# Patient Record
Sex: MALE | Race: WHITE
[De-identification: names, ages, dates, MRNs, and addresses within clinical notes are randomized per-mention and may not be internally consistent; named-entity substitution may affect disease eponyms.]

---

## 2017-07-10 ENCOUNTER — HOSPITAL ENCOUNTER (OUTPATIENT)
Dept: HOSPITAL 35 - PAIN | Age: 67
Discharge: HOME | End: 2017-07-10
Attending: ANESTHESIOLOGY
Payer: COMMERCIAL

## 2017-07-10 VITALS — HEIGHT: 72.99 IN | BODY MASS INDEX: 26.9 KG/M2 | WEIGHT: 203 LBS

## 2017-07-10 VITALS — DIASTOLIC BLOOD PRESSURE: 76 MMHG | SYSTOLIC BLOOD PRESSURE: 132 MMHG

## 2017-07-10 DIAGNOSIS — M25.551: Primary | ICD-10-CM

## 2017-07-10 DIAGNOSIS — F11.20: ICD-10-CM

## 2018-05-10 ENCOUNTER — HOSPITAL ENCOUNTER (OUTPATIENT)
Dept: HOSPITAL 35 - PAIN | Age: 68
End: 2018-05-10
Attending: ANESTHESIOLOGY
Payer: COMMERCIAL

## 2018-05-10 VITALS — BODY MASS INDEX: 29.24 KG/M2 | WEIGHT: 220.6 LBS | HEIGHT: 72.99 IN

## 2018-05-10 VITALS — DIASTOLIC BLOOD PRESSURE: 83 MMHG | SYSTOLIC BLOOD PRESSURE: 139 MMHG

## 2018-05-10 DIAGNOSIS — G89.29: ICD-10-CM

## 2018-05-10 DIAGNOSIS — M25.551: Primary | ICD-10-CM

## 2018-12-06 ENCOUNTER — HOSPITAL ENCOUNTER (OUTPATIENT)
Dept: HOSPITAL 35 - PAIN | Age: 68
End: 2018-12-06
Attending: CLINICAL NURSE SPECIALIST
Payer: COMMERCIAL

## 2018-12-06 VITALS — DIASTOLIC BLOOD PRESSURE: 86 MMHG | SYSTOLIC BLOOD PRESSURE: 139 MMHG

## 2018-12-06 VITALS — BODY MASS INDEX: 29.29 KG/M2 | WEIGHT: 221 LBS | HEIGHT: 72.99 IN

## 2018-12-06 DIAGNOSIS — G89.29: ICD-10-CM

## 2018-12-06 DIAGNOSIS — M19.90: ICD-10-CM

## 2018-12-06 DIAGNOSIS — Z79.899: ICD-10-CM

## 2018-12-06 DIAGNOSIS — M25.551: Primary | ICD-10-CM

## 2019-07-18 ENCOUNTER — HOSPITAL ENCOUNTER (OUTPATIENT)
Dept: HOSPITAL 35 - PAIN | Age: 69
End: 2019-07-18
Attending: CLINICAL NURSE SPECIALIST
Payer: COMMERCIAL

## 2019-07-18 VITALS — DIASTOLIC BLOOD PRESSURE: 84 MMHG | SYSTOLIC BLOOD PRESSURE: 127 MMHG

## 2019-07-18 VITALS — HEIGHT: 72.99 IN | BODY MASS INDEX: 29.21 KG/M2 | WEIGHT: 220.4 LBS

## 2019-07-18 DIAGNOSIS — M96.1: ICD-10-CM

## 2019-07-18 DIAGNOSIS — M25.551: Primary | ICD-10-CM

## 2019-07-18 DIAGNOSIS — Z79.891: ICD-10-CM

## 2019-07-18 DIAGNOSIS — G89.29: ICD-10-CM

## 2019-07-18 NOTE — NUR
Pain Clinic Assessment:
 
1. History of Osteoarthritis:
RT HIP
   History of Rheumatoid Arthritis:
Not Applicable
 
2. Height: 6 ft. 1 in. 185.4 cm.
   Weight: 220.4 lb.  oz. 99.973 kg.
   Patient's BMI: 29.1
 
3. Vital Signs:
   BP: 127/84 Pulse: 70 Resp: 16
   Temp:  02 Sat: 96 ECG Mon:
 
4. Pain Intensity: 3
 
5. Fall Risk:
   Dizziness: N  Needs help standing or walking: N
   Fallen in the last 3 months: N
   Fall risk comments:
 
 
6. Patient on Blood Thinner: None
 
7. History of Hypertension: N
 
8. Opioid Therapy greater than 6 weeks: Y
   Opiate Contract Signed: 10/15/14
 
9. Risk Assessment Tool Provided: Opioid Risk Tool
 
10. Functional Assessment Tool:
 
11. Recreational Drug Use: Never Drug Type:
    Tobacco Use: Never Smoker Tobacco Type:
       Amount or Packs/day:  How Many Years:
    Alcohol Use: No  Frequency:  Quant:

## 2019-11-11 ENCOUNTER — HOSPITAL ENCOUNTER (OUTPATIENT)
Dept: HOSPITAL 35 - PAIN | Age: 69
End: 2019-11-11
Attending: CLINICAL NURSE SPECIALIST
Payer: COMMERCIAL

## 2019-11-11 VITALS — WEIGHT: 221 LBS | HEIGHT: 72.99 IN | BODY MASS INDEX: 29.29 KG/M2

## 2019-11-11 VITALS — SYSTOLIC BLOOD PRESSURE: 155 MMHG | DIASTOLIC BLOOD PRESSURE: 80 MMHG

## 2019-11-11 DIAGNOSIS — Z79.899: ICD-10-CM

## 2019-11-11 DIAGNOSIS — M96.1: ICD-10-CM

## 2019-11-11 DIAGNOSIS — Z79.891: ICD-10-CM

## 2019-11-11 DIAGNOSIS — G89.4: ICD-10-CM

## 2019-11-11 DIAGNOSIS — M25.551: Primary | ICD-10-CM

## 2019-11-11 NOTE — NUR
Pain Clinic Assessment:
 
1. History of Osteoarthritis:
RT HIP
   History of Rheumatoid Arthritis:
Not Applicable
 
2. Height: 6 ft. 1 in. 185.4 cm.
   Weight: 221.0 lb.  oz. 100.245 kg.
   Patient's BMI: 29.2
 
3. Vital Signs:
   BP: 155/80 Pulse: 62 Resp: 14
   Temp:  02 Sat: 98 ECG Mon:
 
4. Pain Intensity: 3
 
5. Fall Risk:
   Dizziness: N  Needs help standing or walking: N
   Fallen in the last 3 months: N
   Fall risk comments:
 
 
6. Patient on Blood Thinner: None
 
7. History of Hypertension: N
 
8. Opioid Therapy greater than 6 weeks: Y
   Opiate Contract Signed: 10/15/14
 
9. Risk Assessment Tool Provided: 4-MOD RISK
 
10. Functional Assessment Tool: 23/70
 
11. Recreational Drug Use: Never Drug Type:
    Tobacco Use: Never Smoker Tobacco Type:
       Amount or Packs/day:  How Many Years:
    Alcohol Use: No  Frequency:  Quant:

## 2019-11-12 NOTE — HPC
[facility]
[address]
[place]   [address]                     PAIN MANAGEMENT CONSULTATION  
_______________________________________________________________________________
 
Name:       WILFREDO PANTOJA         Room #:                     REG [facility] 
ADEEL#:      [id number]                       Account #:      [id number]  
Admission:  11/11/19    Attend Phys:    Kathy Levy     
Discharge:              Date of Birth:  [birth year]  
                                                          Report #: [id number]
                                                                    [id number]   
_______________________________________________________________________________
THIS REPORT FOR:   //name//                          
 
CC: Kathy Rangel [facility]
 
DATE OF SERVICE:  11/11/2019
 
 
CHIEF COMPLAINT:  Chronic intractable hip pain.
 
HISTORY OF PRESENT ILLNESS:  This is a very pleasant 69-year-old gentleman who
returns to the pain clinic today for his ongoing right hip pain.  He believes it
is piriformis in nature.  He is rating a pain score of 3/10 today as a dull
aching pain, worse when he is very active and walking or driving his car.  He
feels that his medication as well as lying down are quite beneficial.  He is
here actually 4 months since his last appointment.  He was able to make his
medicines last that long, though he reports he has not been aware that it had
been 4 months since his last visit.  He feels that some days he must do better
than others, forgetting to take his pills when he is more active and not needing
them.  He denies any problems with constipation or daytime sleepiness from his
medications.  He does safeguard those especially when he is away from home and
traveling.  Today, he would like refills of his OxyContin and hydrocodone.
 
ALLERGIES:  DEMEROL.
 
CURRENT LIST OF MEDICATIONS:  OxyContin 20 mg b.i.d., hydrocodone 7.5/325 b.i.d.
p.r.n., Januvia 100 mg daily, Cymbalta 60 mg daily, Synthroid 125 mcg daily,
Glucophage 1000 mg b.i.d., and lorazepam p.r.n.
 
PQRS:
1.  The patient has osteoarthritis in his right hip.  He denies any rheumatoid
arthritis.
2.  Height is 6 feet 1 inch, weight is 221.  BMI is 29.
3.  Vital signs 155/80, pulse is 62, respirations 14, oxygen sat is 98.
4. Pain score is 3.
5.  Denies dizziness, does not need help walking or standing, has not fallen in
the last 3 months.
6.  The patient is not on blood thinner or hypertension medicines.
7.  Opioid therapy is greater than 6 weeks; therefore, an opioid signed contract
is on the chart.  Risk assessment tool is moderate.  Functional assessment is
23/70.
8.  Recreational drug use, he denies.  He is not a smoker and does not drink
alcohol.
 
According to the prescription monitoring system, the patient is filling
appropriately for his medications, actually filling slightly longer than 30 days
 
 
 
[place] [address]                     PAIN MANAGEMENT CONSULTATION  
_______________________________________________________________________________
 
Name:       WILFREDO PANTOJA         Room #:                     REG REYM DENIS#:      [id number]                       Account #:      [id number]  
Admission:  11/11/19    Attend Phys:    Kathy Levy     
Discharge:              Date of Birth:  [birth year]  
                                                          Report #: [id number]
                                                                    [id number]   
_______________________________________________________________________________
and in between fills, he tells me he safeguards his medications at all times.
 
PHYSICAL EXAMINATION:
GENERAL:  This is a pleasant and alert 69-year-old gentleman who appears his
stated age, placing his current pain score at 3/10 today.  He is a good
historian.
HEENT:  Normocephalic, atraumatic.  Extraocular eye muscles are intact.
NECK:  Without adenopathy or JVD.
MUSCULOSKELETAL:  He walks with a normal gait.  He has tenderness over his right
hip with no radiculopathy.  Strength in his lower extremities judged to be 5/5
in all major muscle groups.
 
IMPRESSION:
1.  Chronic intractable right hip pain.
2.  Post-laminectomy syndrome.
3.  Management of high risk medications under terms of written opioid agreement.
 
PLAN:
1.  We discussed treatment options with the patient today.  The patient feels he
is doing quite well with his current medication regimen, requiring some days
less than the prescribed amount, doing quite well, is keeping very active and
traveling, busy at home.  Scripts given today for his hydrocodone 7.5/325, #60
and OxyContin 20 mg, #60.  Those medicines were released today, 4-week and
8-week.  MRI electronically sent to [facility].
2.  According to the prescription monitoring system, the patient's morphine
milliequivalent is 75 morphine milliequivalents per day.
3.  The patient will return in 3-4 months as needed for medication refills.  He
will continue to safeguard these medications.
 
The patient is seen in collaboration with Dr. Konstantin Patterson today.
 
 
 
 
 
 
 
 
 
 
 
 
 
 
  <ELECTRONICALLY SIGNED>
   By: Kathy Levy             
  11/12/19     1035
D: 11/11/19 1540                           _____________________________________
T: 11/12/19 0411                           Kathy Levy               /nt

## 2020-02-13 ENCOUNTER — HOSPITAL ENCOUNTER (OUTPATIENT)
Dept: HOSPITAL 35 - PAIN | Age: 70
End: 2020-02-13
Attending: CLINICAL NURSE SPECIALIST
Payer: COMMERCIAL

## 2020-02-13 VITALS — BODY MASS INDEX: 29.9 KG/M2 | HEIGHT: 72.99 IN | WEIGHT: 225.6 LBS

## 2020-02-13 VITALS — DIASTOLIC BLOOD PRESSURE: 88 MMHG | SYSTOLIC BLOOD PRESSURE: 127 MMHG

## 2020-02-13 DIAGNOSIS — Z79.899: ICD-10-CM

## 2020-02-13 DIAGNOSIS — M16.11: ICD-10-CM

## 2020-02-13 DIAGNOSIS — G89.4: ICD-10-CM

## 2020-02-13 DIAGNOSIS — Z79.891: ICD-10-CM

## 2020-02-13 DIAGNOSIS — M96.1: ICD-10-CM

## 2020-02-13 DIAGNOSIS — Z76.0: Primary | ICD-10-CM

## 2020-02-13 NOTE — NUR
Pain Clinic Assessment:
 
1. History of Osteoarthritis:
RT HIP
   History of Rheumatoid Arthritis:
Not Applicable
 
2. Height: 6 ft. 1 in. 185.4 cm.
   Weight: 225.6 lb.  oz. 102.332 kg.
   Patient's BMI: 29.8
 
3. Vital Signs:
   BP: 127/88 Pulse: 75 Resp: 16
   Temp:  02 Sat: 98 ECG Mon:
 
4. Pain Intensity: 3-4
 
5. Fall Risk:
   Dizziness: N  Needs help standing or walking: N
   Fallen in the last 3 months: N
   Fall risk comments:
 
 
6. Patient on Blood Thinner: None
 
7. History of Hypertension: N
 
8. Opioid Therapy greater than 6 weeks: Y
   Opiate Contract Signed: 10/15/14
 
9. Risk Assessment Tool Provided: 4-MOD RISK
 
10. Functional Assessment Tool: 23/70
 
11. Recreational Drug Use: Never Drug Type:
    Tobacco Use: Never Smoker Tobacco Type:
       Amount or Packs/day:  How Many Years:
    Alcohol Use: No  Frequency:  Quant:

## 2020-02-18 NOTE — HPC
[facility]
[address]
[place]   [address]                     PAIN MANAGEMENT CONSULTATION  
_______________________________________________________________________________
 
Name:       WILFREDO PANTOJA         Room #:                     REG [facility] 
M.R.#:      [id number]                       Account #:      [id number]  
Admission:  02/13/20    Attend Phys:    Kathy Levy     
Discharge:              Date of Birth:  [birth year]  
                                                          Report #: [id number]
                                                                    [id number]   
_______________________________________________________________________________
THIS REPORT FOR:  
 
cc:  Juan Carlos Maradiaga MD,Juan Carlos Levy,Kathy FERNANDEZ                                            ~
THIS REPORT FOR:   //name//                          
 
CC: Kathy Maradiaga
 
DATE OF SERVICE:  02/13/2020
 
 
CHIEF COMPLAINT:  Chronic intractable hip pain.
 
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
uses to help treat his ongoing chronic right hip pain.  He reports it is a
constant, aching, dull pain of 3/10 today, it is worse with prolonged standing
and walking, especially up hills and driving a car.  He feels the medications
are very beneficial.  He reports no side effects of constipation or daytime
sleepiness.  Today, he would like refills of those medications.  He reports he
recently returned from [place] for a conference and he was able to be as active as
he would like with limited pain due to using his medications.
 
ALLERGIES:  DEMEROL.
 
CURRENT LIST OF MEDICATIONS:  OxyContin 20 mg b.i.d., hydrocodone 7.5/325
p.r.n., Januvia, Cymbalta, Synthroid, Glucophage, and lorazepam.
 
PQRS:
1.  He has osteoarthritis in his right hip.  Denies any rheumatoid arthritis.
2.  Height is 6 feet 1 inch, weight is 225.  BMI is 29.
3.  Vital signs 127/88, pulse is 75, respirations 16, oxygen sat is 98.
4.  Pain score is 3-4.
5.  Denies dizziness, does not need help walking or standing, has not fallen in
the last 3 months.
6.  The patient is not on any blood thinners or medicine for hypertension. 
Opioid therapy is greater than 6 weeks; therefore, an opioid signed contract is
on the chart.  Risk assessment is moderate.  Functional assessment is 23/70.
7.  Recreational drug use, he denies.  He is not a smoker and does not drink
alcohol.
 
According to the prescription monitoring system, the patient is filling
appropriately for his medication.  He is due to fill those today in a timely
fashion.  There is a recent drug screen on the chart.  We will check a random
 
 
 
[place] [address]                     PAIN MANAGEMENT CONSULTATION  
_______________________________________________________________________________
 
Name:       WILFREDO PANTOJA         Room #:                     REG AMADOR DENIS#:      [id number]                       Account #:      [id number]  
Admission:  02/13/20    Attend Phys:    Kathy Levy     
Discharge:              Date of Birth:  [birth year]  
                                                          Report #: [id number]
                                                                    [id number]   
_______________________________________________________________________________
drug screen on him at his next visit.  His morphine mEq according to the CDC
guidelines are 70 per day; therefore, he is seen every 2 months.
 
PHYSICAL EXAMINATION:
GENERAL:  This is alert and orientated 69-year-old gentleman who appears his
stated age, placing his current pain score at 3/10 today.
HEENT:  Normocephalic, atraumatic.  Mucous membranes are moist.
NECK:  He walks with a normal gait.
MUSCULOSKELETAL:  He has tenderness in his right hip with no radicular symptoms.
 His lower extremity strength judged to be 5/5 in all major muscle groups.  He
moves from sitting to standing easily with no difficulty.
 
IMPRESSION:
1.  Chronic intractable right hip pain.
2.  Post-laminectomy syndrome.
3.  Management of high risk medications under terms of written opioid agreement.
 
We reviewed the fact that opiate medications are being used to provide analgesia
adequate to support activities of daily living, not attempting to achieve a
specific pain score on the 0-10 Visual Analog Scale.  The current opiate
medications are providing sufficient analgesia to allow the patient to
participate in activities of daily living.  The patient is not exhibiting any
aberrant behavior suggestive of drug diversion.  The patient is not having any
adverse reactions to medications.  The patient is not suffering from daytime
somnolence or mental acuity changes.  The patient is managing opiate-induced
constipation with appropriate over-the-counter agents and dietary
considerations.  The patient was counseled on concern for caution with operating
a motor vehicle while using opiate medications.
 
PLAN:  We discussed treatment options with the patient today.  He is doing quite
well on his current medication regimen, having sufficient analgesic response
with being able to be as active as he would like with no side effects per his
report.  We will electronically send his OxyContin 20 mg b.i.d., #60 for today
at 4-week and 8-week release as well as his hydrocodone 7.5/325 for today for an
8-week release.  These will be sent electronically by Dr. Konstantin Patterson who
collaborated care with me today.
 
 
 
 
 
 
 
 
  <ELECTRONICALLY SIGNED>
   By: Kathy Levy             
  02/18/20     0858
D: 02/13/20 1452                           _____________________________________
T: 02/13/20 2055                           Kathy Levy               /nt

## 2020-05-28 ENCOUNTER — HOSPITAL ENCOUNTER (OUTPATIENT)
Dept: HOSPITAL 35 - PAIN | Age: 70
End: 2020-05-28
Attending: ANESTHESIOLOGY
Payer: COMMERCIAL

## 2020-05-28 VITALS — WEIGHT: 225.8 LBS | HEIGHT: 72.99 IN | BODY MASS INDEX: 29.93 KG/M2

## 2020-05-28 VITALS — DIASTOLIC BLOOD PRESSURE: 106 MMHG | SYSTOLIC BLOOD PRESSURE: 135 MMHG

## 2020-05-28 DIAGNOSIS — M16.11: Primary | ICD-10-CM

## 2020-05-28 DIAGNOSIS — M96.1: ICD-10-CM

## 2020-05-28 DIAGNOSIS — G89.29: ICD-10-CM

## 2020-05-28 DIAGNOSIS — F11.20: ICD-10-CM

## 2020-05-28 NOTE — NUR
Pain Clinic Assessment:
 
1. History of Osteoarthritis:
RT HIP
   History of Rheumatoid Arthritis:
Not Applicable
 
2. Height: 6 ft. 1 in. 185.4 cm.
   Weight: 225.8 lb.  oz. 102.422 kg.
   Patient's BMI: 29.8
 
3. Vital Signs:
   BP: 135/106 Pulse: 74 Resp: 18
   Temp:  02 Sat: 99 ECG Mon:
 
4. Pain Intensity: 3-4
 
5. Fall Risk:
   Dizziness: N  Needs help standing or walking: N
   Fallen in the last 3 months: N
   Fall risk comments:
 
 
6. Patient on Blood Thinner: None
 
7. History of Hypertension: N
 
8. Opioid Therapy greater than 6 weeks: Y
   Opiate Contract Signed: 10/15/14
 
9. Risk Assessment Tool Provided: 4-MOD RISK
 
10. Functional Assessment Tool: 31/70
 
11. Recreational Drug Use: Never Drug Type:
    Tobacco Use: Never Smoker Tobacco Type:
       Amount or Packs/day:  How Many Years:
    Alcohol Use: No  Frequency:  Quant:

## 2020-05-28 NOTE — HPC
[facility]
[address]
[place]   [address]                     PAIN MANAGEMENT CONSULTATION  
_______________________________________________________________________________
 
Name:       WILFREDO EMMANUEL         Room #:                     REG [facility] 
M.R.#:      [id number]                       Account #:      [id number]  
Admission:  05/28/20    Attend Phys:    Konstantin Patterson MD 
Discharge:              Date of Birth:  [birth year]  
                                                          Report #: [id number]
                                                                    [id number]   
_______________________________________________________________________________
THIS REPORT FOR:  
 
cc:  Juan Carlos Maradiaga MD, Bruce H. MD Morgan,Konstantin TOLEDO MD                                         ~
CC: Juan Carlos Patterson
 
DATE OF SERVICE:  05/28/2020
 
 
Followup visit for management of opioid medications under terms of written
opioid agreement.
 
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  I have
provided medications for him for over 10 years.  He has been on opioid
medications for roughly 20 years.  He was once on much higher dose and has
reduced his dose and morphine milligram equivalency to around 80.  He takes
long-acting oxycodone 20 mg morning and evening and has hydrocodone 10/325,
which he uses for breakthrough pain, both before and after particularly painful
activities.
 
We had a lengthy discussion today about ____ of opioid use for chronic
intractable pain.  I shared my thoughts, our process, the CDC guidelines and
also showed him his [facility] prescription drug monitoring
program information.  It did point out the use of relative high dose of
lorazepam prescribed by Dr. Maradiaga.  We then discussed at some length the
potential risks of benzodiazepine and opioids and their interaction.  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
harm and as long as he safeguards his medications, takes them as he is currently
using them the likelihood of harm is small due to long-term tolerance.  We have
talked at times about reducing his dose further and I have encouraged him to
consider this.  For the moment; however, his pain is well controlled.  His pain
scores are 3-4 and we will stick with the status quo.  I did point out that
changes may be coming that are outside of our control, which may be either
regulatory, insurance or supply driven.  He will also at some point in time need
another physician to prescribe his medication due to my age and possibility of
retiring from practice in the next few years.
 
He reports he is doing well as pain medication works very effectively for him. 
It improves his daily pain control and function.  He is able to do more with
less discomfort.  He has remained active with exercise, walks when he can.  He
safeguards his medications carefully and denies any side effects from
medications or the combination of benzodiazepine and opioid.
 
 
 
 
[address]   [address]                     PAIN MANAGEMENT CONSULTATION  
_______________________________________________________________________________
 
Name:       WILFREDO EMMANUEL JUAN CARLOS         Room #:                     REG [facility].#:      [id number]                       Account #:      [id number]  
Admission:  05/28/20    Attend Phys:    Konstantin Patterson MD 
Discharge:              Date of Birth:  [birth year]  
                                                          Report #: [id number]
                                                                    [id number]   
_______________________________________________________________________________
PQRS:  Positive for osteoarthritis in the right hip.  The patient's BMI is 29.8.
 Blood pressure 135/106, heart rate 74, respirations 18, O2 sat 99.  He will
follow with Dr. Maradiaga.  He has not fallen in the last 3 months.  He is on no
blood thinning medications.  He is not being treated for hypertension, but
should see Dr. Maradiaga to follow up on his elevation of diastolic blood
pressure today.  Opioid agreement has been signed on several occasions, most
recently about 4 or 5 years ago and we discussed the terms of that agreement. 
He is at moderate risk for addiction by the opioid risk tool.  Functional
assessment score is 31, suggesting impact on his day-to-day activities by
chronic pain, but pretty good, decent management.  Denies use of tobacco or
alcohol.
 
IMPRESSION:
1.  Chronic intractable right hip pain, osteoarthritis.
2.  Post-laminectomy syndrome.
3.  Management of high risk medications under terms of an opioid agreement.
 
Medications were renewed today electronically.  His MME is around 80.  We talked
about carefully safeguarding medications and I will see him back in the pain
clinic in 3 months.
 
 
 
 
 
 
 
 
 
 
 
 
 
 
 
 
 
 
 
 
 
 
 
 
                         
   By:                               
                   
D: 05/28/20 1235                           _____________________________________
T: 05/28/20 1944                           Konstantin Patterson MD           /nt

## 2020-09-14 ENCOUNTER — HOSPITAL ENCOUNTER (OUTPATIENT)
Dept: HOSPITAL 35 - PAIN | Age: 70
End: 2020-09-14
Attending: CLINICAL NURSE SPECIALIST
Payer: COMMERCIAL

## 2020-09-14 VITALS — WEIGHT: 223 LBS | HEIGHT: 72.99 IN | BODY MASS INDEX: 29.55 KG/M2

## 2020-09-14 VITALS — DIASTOLIC BLOOD PRESSURE: 88 MMHG | SYSTOLIC BLOOD PRESSURE: 150 MMHG

## 2020-09-14 DIAGNOSIS — Z79.899: ICD-10-CM

## 2020-09-14 DIAGNOSIS — M25.551: Primary | ICD-10-CM

## 2020-09-14 DIAGNOSIS — G89.29: ICD-10-CM

## 2020-09-14 DIAGNOSIS — M96.1: ICD-10-CM

## 2020-09-14 DIAGNOSIS — F11.20: ICD-10-CM

## 2020-09-14 DIAGNOSIS — Z88.8: ICD-10-CM

## 2020-09-14 NOTE — NUR
Pain Clinic Assessment:
 
1. History of Osteoarthritis:
RT HIP
   History of Rheumatoid Arthritis:
Not Applicable
 
2. Height: 6 ft. 1 in. 185.4 cm.
   Weight: 223.0 lb.  oz. 101.152 kg.
   Patient's BMI: 29.4
 
3. Vital Signs:
   BP: 150/88 Pulse: 71 Resp: 16
   Temp:  02 Sat: 100 ECG Mon:
 
4. Pain Intensity: 3
 
5. Fall Risk:
   Dizziness: N  Needs help standing or walking: N
   Fallen in the last 3 months: N
   Fall risk comments:
 
 
6. Patient on Blood Thinner: None
 
7. History of Hypertension: N
 
8. Opioid Therapy greater than 6 weeks: Y
   Opiate Contract Signed: 10/15/14
 
9. Risk Assessment Tool Provided: 4-MOD RISK
 
10. Functional Assessment Tool: 31/70
 
11. Recreational Drug Use: Never Drug Type:
    Tobacco Use: Never Smoker Tobacco Type:
       Amount or Packs/day:  How Many Years:
    Alcohol Use: No  Frequency:  Quant:

## 2020-09-15 NOTE — HPC
[facility]
[address]
[place]   [address]                     PAIN MANAGEMENT CONSULTATION  
_______________________________________________________________________________
 
Name:       WILFREDO PANTOJA         Room #:                     REG [facility] 
M..#:      [id number]                       Account #:      [id number]  
Admission:  09/14/20    Attend Phys:    Kathy Levy     
Discharge:              Date of Birth:  [birth year]  
                                                          Report #: [id number]
                                                                    [id number]   
_______________________________________________________________________________
THIS REPORT FOR:  
 
cc:  Juan Carlos Maradiaga MD, Bruce H. MD Hocker, Amanda CNS                                            ~
CC: Konstantin Maradiaga
 
DATE OF SERVICE:  09/14/2020
 
 
CHIEF COMPLAINT:  Chronic intractable hip pain.
 
HISTORY OF PRESENT ILLNESS:  This is a 70-year-old gentleman who returns to the
pain clinic today for refill of his opioid medications.  He feels that they are
very beneficial in controlling his pain.  He has been on a stable dose for many
years.  He does report that last week with the weather changes, he did have
increased right hip pain, but it has subsided now that the weather is not as
rainy.  Patient states it is a dull aching, constant pain of 3/10 today.  It is
worse with standing, walking and steps.  He feels as I can say, the medication
and lying down are beneficial.  The patient does report that he was very active
when he was in [place] with grandchildren for several weeks.  He found
that during that time, he did have to rest periodically throughout the day.  He
believes that was from so many stairs and so much more activity than he is used
to on a normal day.  He denies any feelings of overmedication or constipation as
a result of his medicines.
 
ALLERGIES:  DEMEROL.
 
CURRENT LIST OF MEDICATIONS:  OxyContin 20 mg b.i.d., hydrocodone 7.5/325
b.i.d., Januvia, Cymbalta, Synthroid, Glucophage, and lorazepam.
 
PQRS:
1.  He has osteoarthritic changes in his right hip.  Denies any rheumatoid
arthritis.
2.  Height is 6 feet 1 inch, weight is 223, BMI is 29.
3.  Vital signs; blood pressure 150/88, pulse is 71, respirations 16, oxygen sat
is 100.
4.  Pain score is 3/10.
5.  Fall risk.  Denies dizziness, does not need help walking or standing, has
not fallen in the last 3 months.  The patient is not on any blood thinners or
medicine for hypertension.
6.  His opioid therapy is greater than 6 weeks; therefore, an opioid signed
contract is on the chart.  Risk assessment is moderate.  Functional assessment
is 31/70.
3.  Recreational drug use, he denies.  He is not a smoker.  Does not drink
 
 
 
[address]   [address]                     PAIN MANAGEMENT CONSULTATION  
_______________________________________________________________________________
 
Name:       WILFREDO PANTOJA         Room #:                     REG [facility] 
ADEEL#:      [id number]                       Account #:      [id number]  
Admission:  09/14/20    Attend Phys:    Kathy Levy     
Discharge:              Date of Birth:  [birth year]  
                                                          Report #: [id number]
                                                                    [id number]   
_______________________________________________________________________________
alcohol.
 
According to the prescription monitoring system, the patient is filling
appropriately for his medication.  His morphine milliequivalent according to the
CDC guidelines is 75.
 
PHYSICAL EXAMINATION:
GENERAL:  This is alert and orientated 70-year-old who appears his stated age,
placing his current pain score at 3/10 today.
HEENT:  Normocephalic, atraumatic.  Extraocular eye muscles are intact.  He is
wearing a mask.
MUSCULOSKELETAL:  He has pain in his right hip that is tender with no radicular
symptoms.  His lower extremity strength judged to be 5/5 in all major muscle
groups.  He does move from sitting to standing without any difficulty and has a
slightly mildly antalgic gait.
 
IMPRESSION:
1.  Chronic intractable right hip pain.
2.  Post-laminectomy syndrome.
3.  Management of high risk medications under terms of written opioid agreement.
 
We reviewed the fact that opiate medications are being used to provide analgesia
adequate to support activities of daily living, not attempting to achieve a
specific pain score on the 0-10 Visual Analog Scale.  The current opiate
medications are providing sufficient analgesia to allow the patient to
participate in activities of daily living.  The patient is not exhibiting any
aberrant behavior suggestive of drug diversion.  The patient is not having any
adverse reactions to medications.  The patient is not suffering from daytime
somnolence or mental acuity changes.  The patient is managing opiate-induced
constipation with appropriate over-the-counter agents and dietary
considerations.  The patient was counseled on concern for caution with operating
a motor vehicle while using opiate medications.
 
PLAN:
1.  We discussed treatment options with the patient today.  The patient finds
his medication very beneficial in controlling his pain.  We will have Dr. Konstantin Patterson send his medications electronically to the pharmacy for OxyContin
20 mg b.i.d., #60 as well as hydrocodone 7.5.
2.  The patient denies any problems with constipation as a result of his
medications.
3.  The patient is seen today in collaboration with Dr. Konstantin Patterson.
 
 
 
  <ELECTRONICALLY SIGNED>
   By: Kathy Levy             
  09/15/20     1300
D: 09/14/20 1107                           _____________________________________
T: 09/14/20 1402                           Kathy Levy               /nt

## 2020-12-10 ENCOUNTER — HOSPITAL ENCOUNTER (OUTPATIENT)
Dept: HOSPITAL 35 - PAIN | Age: 70
End: 2020-12-10
Attending: CLINICAL NURSE SPECIALIST
Payer: COMMERCIAL

## 2020-12-10 VITALS — DIASTOLIC BLOOD PRESSURE: 93 MMHG | SYSTOLIC BLOOD PRESSURE: 139 MMHG

## 2020-12-10 VITALS — HEIGHT: 72.01 IN | BODY MASS INDEX: 31.4 KG/M2 | WEIGHT: 231.8 LBS

## 2020-12-10 DIAGNOSIS — Z79.891: ICD-10-CM

## 2020-12-10 DIAGNOSIS — G89.4: ICD-10-CM

## 2020-12-10 DIAGNOSIS — M96.1: ICD-10-CM

## 2020-12-10 DIAGNOSIS — M25.551: Primary | ICD-10-CM

## 2020-12-10 NOTE — NUR
Pain Clinic Assessment:
 
1. History of Osteoarthritis:
RT HIP
   History of Rheumatoid Arthritis:
Not Applicable
 
2. Height: 6 ft. 0 in. 182.9 cm.
   Weight: 231.8 lb.  oz. 105.144 kg.
   Patient's BMI: 31.4
 
3. Vital Signs:
   BP: 139/93 Pulse: 87 Resp: 16
   Temp:  02 Sat: 100 ECG Mon:
 
4. Pain Intensity: 3
 
5. Fall Risk:
   Dizziness: N  Needs help standing or walking: N
   Fallen in the last 3 months: N
   Fall risk comments:
 
 
6. Patient on Blood Thinner: None
 
7. History of Hypertension: N
 
8. Opioid Therapy greater than 6 weeks: Y
   Opiate Contract Signed: 10/15/14
 
9. Risk Assessment Tool Provided: 4-MOD RISK
 
10. Functional Assessment Tool: 31/70
 
11. Recreational Drug Use: Never Drug Type:
    Tobacco Use: Never Smoker Tobacco Type:
       Amount or Packs/day:  How Many Years:
    Alcohol Use: No  Frequency:  Quant:

## 2020-12-11 NOTE — HPC
[facility]
[address]
[place]   [address]                     PAIN MANAGEMENT CONSULTATION  
_______________________________________________________________________________
 
Name:       WILFREDO PANTOJA         Room #:                     REG [facility].#:      [id number]                       Account #:      [id number]  
Admission:  12/10/20    Attend Phys:    Kathy Levy     
Discharge:              Date of Birth:  [birth year]  
                                                          Report #: [id number]
                                                                    [id number]   
_______________________________________________________________________________
THIS REPORT FOR:  
 
cc:  Juan Carlos Maradiaga MD, Bruce H. MD Hocker,Kathy FERNANDEZ                                            ~
DATE OF SERVICE:  12/10/2020
 
 
CHIEF COMPLAINT:  Chronic intractable hip pain. 
 
HISTORY OF PRESENT ILLNESS:  This is a very pleasant 70-year-old gentleman who
returns for medication renewal today in the pain clinic.  He feels that his pain
medication is beneficial in controlling his ongoing hip pain.  Today, he is
reporting this pain score of 3/10, stating it is a constant, aching, dull pain
that is exacerbated by standing, walking or driving a car.  He believes the
medication as well as lying down have been beneficial.  He tries to stay as
active as possible and believe this is helpful as well.  He denies constipation
or daytime somnolence as a result of any of his medications. 
 
ALLERGIES:  DEMEROL. 
 
CURRENT LIST OF MEDICATIONS:  OxyContin 20 mg b.i.d., hydrocodone 7.5/325
b.i.d., Januvia, Cymbalta, Synthroid, Glucophage, and lorazepam. 
 
PQRS: 
1.  He has osteoarthritis in his hip.  Denies any rheumatoid arthritis. 
2.  Height is 6 feet, weight is 231, BMI is 31. 
3.  Vital signs 139/93, pulse is 87, respirations 16, and oxygen sat is 100%. 
4.  Pain score is 3/10. 
5.  Denies dizziness, does not need help walking or standing, has not fallen in
the last 3 months. 
6.  The patient is not on any blood thinners or medicines for hypertension. 
7.  Opioid signed contract is on the chart.  Risk assessment is low.  Functional
assessment is 31/70. 
8.  Recreational drug use, he denies.  He is not a smoker and does not drink
alcohol. 
 
According to the prescription monitoring system, the patient is filling
appropriately in a timely fashion.  His morphine mEq is 70 according to the
PDMP. 
 
PHYSICAL EXAMINATION: 
GENERAL:  This is alert and orientated 70-year-old gentleman who appears his
stated age, placing his current pain score at 3/10 today. 
HEENT:  Normocephalic, atraumatic.  Extraocular eye muscles are intact.  Mucous
membranes are moist.  He is wearing a mask. 
 
 
 
[address]   [address]                     PAIN MANAGEMENT CONSULTATION  
_______________________________________________________________________________
 
Name:       KITWILFREDO JUAN CARLOS         Room #:                     REG [facility] 
ADEEL#:      [id number]                       Account #:      [id number]  
Admission:  12/10/20    Attend Phys:    Kathy Levy     
Discharge:              Date of Birth:  [birth year]  
                                                          Report #: [id number]
                                                                    [id number]   
_______________________________________________________________________________
MUSCULOSKELETAL:  Tenderness in his right hip with no radicular symptoms.  Pain
is increased with ambulation and he does have a mildly antalgic gait.  His lower
extremity strength is symmetrical at 5/5. 
 
IMPRESSION: 
1.  Chronic intractable right hip pain. 
2.  Post-laminectomy syndrome. 
3.  Management of high risk medications under terms of written agreement. 
 
We reviewed the fact that opiate medications are being used to provide analgesia
adequate to support activities of daily living, not attempting to achieve a
specific pain score on the 0-10 Visual Analog Scale.  The current opiate
medications are providing sufficient analgesia to allow the patient to
participate in activities of daily living.  The patient is not exhibiting any
aberrant behavior suggestive of drug diversion.  The patient is not having any
adverse reactions to medications.  The patient is not suffering from daytime
somnolence or mental acuity changes.  The patient is managing opiate-induced
constipation with appropriate over-the-counter agents and dietary
considerations.  The patient was counseled on concern for caution with operating
a motor vehicle while using opiate medications. 
 
A physical exam was performed and the patient's functional status was evaluated.
 All patients with back pain were advised against the bed rest greater than 4
days and were advised to return to normal activities.  Pain score assessment was
noted and the treatment plan was reviewed with the patient.  All current
medications, both prescribed and OTC were reviewed and reconciled on the
electronic medical record.  Tobacco screening was accomplished and smoking
cessation was advised when indicated.  BMI was noted and diet/exercise
modification was recommended for all patients following outside normal
parameters. 
 
I reviewed with the patient today their responsibilities to safeguard
prescription medications, reviewed their responsibility to utilize medications
only as prescribed by the physician.  They are to seek and receive pain
medications only from 1 physician group ([facility]).  They are to use 1
pharmacy and keep the clinic informed if they change pharmacies.  Their
responsibilities include making followup visits in a timely fashion and to avoid
abrupt discontinuation of medication usage.  Their responsibilities further
include bringing their medications (bottles from the pharmacy with residual
pills) to the visit for possible confirmation of pill counts and the patient
understands it is their responsibility to submit to random drug screens to
ensure both that the medications prescribed are present, and that no other
controlled substances are present.  All prescriptions provided today were
generated electronically. 
 
PLAN: 
 
 
 
[address]   [address]                     PAIN MANAGEMENT CONSULTATION  
_______________________________________________________________________________
 
Name:       WILFREDO PANTOJA         Room #:                     REG [facility]#:      [id number]                       Account #:      [id number]  
Admission:  12/10/20    Attend Phys:    Kathy Levy     
Discharge:              Date of Birth:  [birth year]  
                                                          Report #: [id number]
                                                                    [id number]   
_______________________________________________________________________________
1.  We discussed treatment options with the patient today.  He would like to
continue on his current regimen that he finds very beneficial with very minimal
side effects and allows him to participate in activities that he enjoys during
his [facility].  We will have Dr. Apolinar Talamantes send his OxyContin 20 mg
tablets, #60 for today, 4-week and 8-week release as well as his hydrocodone
7.5/325, #60 for 3 months as well. 
2.  The patient is seen today in collaboration with Dr. Talamantes who is covering
for Dr. Konstantin Patterson.
 
 
 
 
 
 
 
 
 
 
 
 
 
 
 
 
 
 
 
 
 
 
 
 
 
 
 
 
 
 
 
 
 
 
 
 
  <ELECTRONICALLY SIGNED>
   By: Kathy Levy             
  12/11/20     1036
D: 12/10/20 1436                           _____________________________________
T: 12/10/20 2237                           aKthy Levy               /nt

## 2021-04-13 ENCOUNTER — HOSPITAL ENCOUNTER (OUTPATIENT)
Dept: HOSPITAL 35 - PAIN | Age: 71
End: 2021-04-13
Attending: CLINICAL NURSE SPECIALIST
Payer: COMMERCIAL

## 2021-04-13 VITALS — BODY MASS INDEX: 31.07 KG/M2 | WEIGHT: 229.4 LBS | HEIGHT: 72.01 IN

## 2021-04-13 VITALS — DIASTOLIC BLOOD PRESSURE: 67 MMHG | SYSTOLIC BLOOD PRESSURE: 125 MMHG

## 2021-04-13 DIAGNOSIS — F11.20: ICD-10-CM

## 2021-04-13 DIAGNOSIS — M96.1: ICD-10-CM

## 2021-04-13 DIAGNOSIS — G89.29: ICD-10-CM

## 2021-04-13 DIAGNOSIS — M25.551: Primary | ICD-10-CM

## 2021-04-13 NOTE — NUR
Pain Clinic Assessment:
 
1. History of Osteoarthritis:
RT HIP
   History of Rheumatoid Arthritis:
Not Applicable
 
2. Height: 6 ft. 0 in. 182.9 cm.
   Weight: 229.4 lb.  oz. 104.055 kg.
   Patient's BMI: 31.1
 
3. Vital Signs:
   BP: 125/67 Pulse: 83 Resp: 16
   Temp:  02 Sat: 98 ECG Mon:
 
4. Pain Intensity: 3
 
5. Fall Risk:
   Dizziness: N  Needs help standing or walking: N
   Fallen in the last 3 months: N
   Fall risk comments:
 
 
6. Patient on Blood Thinner: None
 
7. History of Hypertension: N
 
8. Opioid Therapy greater than 6 weeks: Y
   Opiate Contract Signed: 10/15/14
 
9. Risk Assessment Tool Provided: 4-MOD RISK
 
10. Functional Assessment Tool: 31/70
 
11. Recreational Drug Use: Never Drug Type:
    Tobacco Use: Never Smoker Tobacco Type:
       Amount or Packs/day:  How Many Years:
    Alcohol Use: No  Frequency:  Quant:

## 2021-04-14 NOTE — HPC
[facility]
[address]
[place]   [address]                     PAIN MANAGEMENT CONSULTATION  
_______________________________________________________________________________
 
Name:       WILFREDO PANTOJA         Room #:                     REG [facility] 
M..#:      [id number]                       Account #:      [id number]  
Admission:  04/13/21    Attend Phys:    Kathy Levy     
Discharge:              Date of Birth:  [birth year]  
                                                          Report #: [id number]
                                                                    [id number]   
_______________________________________________________________________________
THIS REPORT FOR:  
 
cc:  Juan Carlos Maradiaga MD, Bruce H. MD Hocker, Amanda CNS ~
DATE OF SERVICE:  04/13/2021
 
 
CHIEF COMPLAINT:  Chronic intractable hip pain. 
 
HISTORY OF PRESENT ILLNESS:  This is a very pleasant 70-year-old gentleman who
returns to the pain clinic today for renewal of his opioid medications.  He
continues to have ongoing right hip pain that is problematic, especially when he
is very active with walking doing steps or prolonged driving.  Weather changes
also affect his hip pain.  Today, he describes his pain as an aching, dull
sensation, rating it a 3/10.  He reports that the medication are very beneficial
as well as lying down.  He has not seen us since last December.  He has spent
some time at his home in [place].  While he is there, the weather makes
his hip less painful and he is distracted more being active with activities and
at times, he is not needing as much pain medicine when he is in [place]. 
While he is back in [place], his pain does increase and currently he has
been taking 2 OxyContin a day and his breakthrough pain medicine as needed.  He
denies any daytime somnolence or constipation issues as a result of his
medications. 
 
ALLERGIES:  DEMEROL. 
 
CURRENT LIST OF MEDICATIONS:  OxyContin 20 mg b.i.d., hydrocodone 7.5/325
p.r.n., lisinopril, Eliquis, Januvia, Synthroid, Glucophage, and lorazepam
p.r.n. 
 
PQRS: 
1.  He has osteoarthritic changes in his hip.  Denies any rheumatoid arthritis. 
2.  Height is 6 feet, weight is 229, BMI is 31. 
3.  Vital signs 125/67, pulse is 83, respirations 16, oxygen sat is 98%. 
4.  Pain score is 3/10. 
5.  Denies dizziness, does not need help walking or standing, has not fallen in
the last 3 months. 
6.  The patient is on Eliquis as well as medications for hypertension. 
7.  Opioid therapy is greater than 6 weeks; therefore, an opioid signed contract
is on the chart.  Risk assessment is moderate.  Functional assessment is 31/70. 
8.  Recreational drug use, he denies.  He is not a smoker and does not drink
alcohol. 
 
According to the prescription monitoring system, the patient is filling
appropriate or slightly longer between intervals of fills.  His morphine
 
 
 
[address]   [address]                     PAIN MANAGEMENT CONSULTATION  
_______________________________________________________________________________
 
Name:       WILFREDO PANTOJA         Room #:                     REG [facility] 
ADEEL#:      [id number]                       Account #:      [id number]  
Admission:  04/13/21    Attend Phys:    Kathy Levy     
Discharge:              Date of Birth:  [birth year]  
                                                          Report #: [id number]
                                                                    [id number]   
_______________________________________________________________________________
milliequivalent according to the CDC guidelines is 75 MMEs.  There is opioid
agreement on the chart as well. 
 
PHYSICAL EXAMINATION: 
GENERAL:  This is alert and orientated, very pleasant 70-year-old gentleman who
appears his stated age, rating his pain score today at 3/10.  He is a good
historian. 
HEENT:  Normocephalic, atraumatic.  Extraocular eye muscles are intact.  Mucous
membranes are moist.  He is wearing a mask. 
MUSCULOSKELETAL:  He has tenderness in his right hip that radiates slightly into
his thigh.  He has a mildly antalgic gait.  He moves easily from the seated to
standing position without difficulty.  His lower extremity strength is
symmetrical at 5/5. 
 
IMPRESSION: 
1.  Chronic intractable right hip pain. 
2.  Post-laminectomy syndrome. 
3.  Management of high risk medications under terms of written opioid agreement.
 
 
We reviewed the fact that opiate medications are being used to provide analgesia
adequate to support activities of daily living, not attempting to achieve a
specific pain score on the 0-10 Visual Analog Scale.  The current opiate
medications are providing sufficient analgesia to allow the patient to
participate in activities of daily living.  The patient is not exhibiting any
aberrant behavior suggestive of drug diversion.  The patient is not having any
adverse reactions to medications.  The patient is not suffering from daytime
somnolence or mental acuity changes.  The patient is managing opiate-induced
constipation with appropriate over-the-counter agents and dietary
considerations.  The patient was counseled on concern for caution with operating
a motor vehicle while using opiate medications.
 
PLAN: 
1.  We discussed treatment options with the patient today.  The patient finds
his OxyContin and hydrocodone very beneficial in decreasing his pain.  At times,
he is able to get by with less opioid medications when he is distracted;
therefore, it has been slightly longer than 3 months since his last fill. 
Today, Dr. Irvin Calderón, who is covering for Dr. Patterson send his medications
electronically for OxyContin 20 mg, #60 for 3 months as well as his hydrocodone
7.5/325, #60. 
2.  Since our last visit with the patient, he has undergone a cardiac ablation
for atrial flutter that was diagnosed in January.  He now is on Xarelto.  I
reminded the patient not to take any oral anti-inflammatory medications.  If he
finds that he needs some type of anti-inflammatory medications, I encouraged him
to use Voltaren gel on his hip and no taking of ibuprofen or Aleve products. 
 
 
 
 
[facility]
[address]
[place]   [address]                     PAIN MANAGEMENT CONSULTATION  
_______________________________________________________________________________
 
Name:       ANEL PANTOJAEIVIRI SIDDIQI         Room #:                     REG [facility].#:      [id number]                       Account #:      [id number]  
Admission:  04/13/21    Attend Phys:    Kathy Levy     
Discharge:              Date of Birth:  [birth year]  
                                                          Report #: [id number]
                                                                    [id number]   
_______________________________________________________________________________
Time spent with the patient in consultation, reviewing recent studies, clinical
notes and physician reports, physical examination and correlation of findings of
medical documentation to determine treatment, 15 minutes. 
 
Time spent preparing for appointment reviewing prescription monitoring system,
reviewing previous records and proposed treatment options and reviewing current
medications, 5 minutes. 
 
Time spent preparing and sending electronic prescriptions with collaborating
physician, Dr. Irvin Calderón, who is covering for Dr. Konstantin Patterson and
documentation of visit and plan of treatment, 4 minutes. 
 
Total time spent 24 minutes.
 
 
 
 
 
 
 
 
 
 
 
 
 
 
 
 
 
 
 
 
 
 
 
 
 
 
 
 
 
 
 
  <ELECTRONICALLY SIGNED>
   By: Kathy Levy             
  04/14/21     0817
D: 04/13/21 1058                           _____________________________________
T: 04/13/21 1951                           Kathy Levy               /nt

## 2021-07-22 ENCOUNTER — HOSPITAL ENCOUNTER (OUTPATIENT)
Dept: HOSPITAL 35 - PAIN | Age: 71
End: 2021-07-22
Attending: ANESTHESIOLOGY
Payer: COMMERCIAL

## 2021-07-22 VITALS — BODY MASS INDEX: 29.8 KG/M2 | WEIGHT: 220 LBS | HEIGHT: 72.01 IN

## 2021-07-22 VITALS — DIASTOLIC BLOOD PRESSURE: 72 MMHG | SYSTOLIC BLOOD PRESSURE: 134 MMHG

## 2021-07-22 DIAGNOSIS — Z79.899: ICD-10-CM

## 2021-07-22 DIAGNOSIS — Z79.891: ICD-10-CM

## 2021-07-22 DIAGNOSIS — Z76.0: Primary | ICD-10-CM

## 2021-07-22 DIAGNOSIS — G89.4: ICD-10-CM

## 2021-07-22 DIAGNOSIS — M96.1: ICD-10-CM

## 2021-07-22 NOTE — NUR
Pain Clinic Assessment:
 
1. History of Osteoarthritis:
RT HIP
   History of Rheumatoid Arthritis:
Not Applicable
 
2. Height: 6 ft. 0 in. 182.9 cm.
   Weight: 220.0 lb.  oz. 99.792 kg.
   Patient's BMI: 29.8
 
3. Vital Signs:
   BP: 134/72 Pulse: 65 Resp: 16
   Temp:  02 Sat: 97 ECG Mon:
 
4. Pain Intensity: 3
 
5. Fall Risk:
   Dizziness: N  Needs help standing or walking: N
   Fallen in the last 3 months: N
   Fall risk comments:
 
 
6. Patient on Blood Thinner: None
 
7. History of Hypertension: N
 
8. Opioid Therapy greater than 6 weeks: Y
   Opiate Contract Signed: 10/15/14
 
9. Risk Assessment Tool Provided: 4-MOD RISK
 
10. Functional Assessment Tool: 37/70
 
11. Recreational Drug Use: Never Drug Type:
    Tobacco Use: Never Smoker Tobacco Type:
       Amount or Packs/day:  How Many Years:
    Alcohol Use: No  Frequency:  Quant:

## 2021-12-02 ENCOUNTER — HOSPITAL ENCOUNTER (OUTPATIENT)
Dept: HOSPITAL 35 - PAIN | Age: 71
End: 2021-12-02
Attending: CLINICAL NURSE SPECIALIST
Payer: COMMERCIAL

## 2021-12-02 VITALS — WEIGHT: 224 LBS | HEIGHT: 72.99 IN | BODY MASS INDEX: 29.69 KG/M2

## 2021-12-02 VITALS — DIASTOLIC BLOOD PRESSURE: 73 MMHG | SYSTOLIC BLOOD PRESSURE: 124 MMHG

## 2021-12-02 DIAGNOSIS — Z79.899: ICD-10-CM

## 2021-12-02 DIAGNOSIS — Z88.8: ICD-10-CM

## 2021-12-02 DIAGNOSIS — Z79.84: ICD-10-CM

## 2021-12-02 DIAGNOSIS — M96.1: ICD-10-CM

## 2021-12-02 DIAGNOSIS — M25.551: ICD-10-CM

## 2021-12-02 DIAGNOSIS — G89.29: Primary | ICD-10-CM

## 2021-12-02 NOTE — NUR
Pain Clinic Assessment:
 
1. History of Osteoarthritis:
RT HIP
   History of Rheumatoid Arthritis:
Not Applicable
 
2. Height: 6 ft. 1 in. 185.4 cm.
   Weight: 224.0 lb.  oz. 101.606 kg.
   Patient's BMI: 29.6
 
3. Vital Signs:
   BP: 124/73 Pulse: 62 Resp: 16
   Temp:  02 Sat: 98 ECG Mon:
 
4. Pain Intensity: 4
 
5. Fall Risk:
   Dizziness: N  Needs help standing or walking: N
   Fallen in the last 3 months: N
   Fall risk comments:
 
 
6. Patient on Blood Thinner: None
 
7. History of Hypertension: N
 
8. Opioid Therapy greater than 6 weeks: Y
   Opiate Contract Signed: 10/15/14
 
9. Risk Assessment Tool Provided: 4-MOD RISK
 
10. Functional Assessment Tool: 37/70
 
11. Recreational Drug Use: Never Drug Type:
    Tobacco Use: Never Smoker Tobacco Type:
       Amount or Packs/day:  How Many Years:
    Alcohol Use: No  Frequency:  Quant: